# Patient Record
Sex: FEMALE | Race: WHITE | Employment: OTHER | URBAN - METROPOLITAN AREA
[De-identification: names, ages, dates, MRNs, and addresses within clinical notes are randomized per-mention and may not be internally consistent; named-entity substitution may affect disease eponyms.]

---

## 2021-02-10 ENCOUNTER — HOSPITAL ENCOUNTER (OUTPATIENT)
Dept: LAB | Age: 81
Discharge: HOME | End: 2021-02-10

## 2021-02-10 PROCEDURE — 88312 SPECIAL STAINS GROUP 1: CPT

## 2021-02-10 PROCEDURE — 88305 TISSUE EXAM BY PATHOLOGIST: CPT

## 2021-02-10 PROCEDURE — 88342 IMHCHEM/IMCYTCHM 1ST ANTB: CPT

## 2022-06-16 SDOH — ECONOMIC STABILITY: FOOD INSECURITY: WITHIN THE PAST 12 MONTHS, YOU WORRIED THAT YOUR FOOD WOULD RUN OUT BEFORE YOU GOT MONEY TO BUY MORE.: NEVER TRUE

## 2022-06-16 SDOH — ECONOMIC STABILITY: FOOD INSECURITY: WITHIN THE PAST 12 MONTHS, THE FOOD YOU BOUGHT JUST DIDN'T LAST AND YOU DIDN'T HAVE MONEY TO GET MORE.: NEVER TRUE

## 2022-06-16 ASSESSMENT — PATIENT HEALTH QUESTIONNAIRE - PHQ9
SUM OF ALL RESPONSES TO PHQ9 QUESTIONS 1 & 2: 0
1. LITTLE INTEREST OR PLEASURE IN DOING THINGS: 0
SUM OF ALL RESPONSES TO PHQ QUESTIONS 1-9: 0
2. FEELING DOWN, DEPRESSED OR HOPELESS: 0
SUM OF ALL RESPONSES TO PHQ QUESTIONS 1-9: 0

## 2022-06-16 ASSESSMENT — LIFESTYLE VARIABLES: HOW OFTEN DO YOU HAVE A DRINK CONTAINING ALCOHOL: NEVER

## 2022-06-16 ASSESSMENT — SOCIAL DETERMINANTS OF HEALTH (SDOH): HOW HARD IS IT FOR YOU TO PAY FOR THE VERY BASICS LIKE FOOD, HOUSING, MEDICAL CARE, AND HEATING?: NOT HARD AT ALL

## 2022-06-17 ENCOUNTER — OFFICE VISIT (OUTPATIENT)
Dept: INTERNAL MEDICINE CLINIC | Facility: CLINIC | Age: 82
End: 2022-06-17
Payer: MEDICARE

## 2022-06-17 VITALS
SYSTOLIC BLOOD PRESSURE: 152 MMHG | BODY MASS INDEX: 23.56 KG/M2 | RESPIRATION RATE: 16 BRPM | HEIGHT: 60 IN | OXYGEN SATURATION: 98 % | DIASTOLIC BLOOD PRESSURE: 70 MMHG | WEIGHT: 120 LBS | TEMPERATURE: 97.6 F | HEART RATE: 67 BPM

## 2022-06-17 DIAGNOSIS — E78.00 PURE HYPERCHOLESTEROLEMIA: ICD-10-CM

## 2022-06-17 DIAGNOSIS — D50.8 IRON DEFICIENCY ANEMIA SECONDARY TO INADEQUATE DIETARY IRON INTAKE: ICD-10-CM

## 2022-06-17 DIAGNOSIS — I10 ESSENTIAL HYPERTENSION, BENIGN: ICD-10-CM

## 2022-06-17 DIAGNOSIS — R21 RASH: ICD-10-CM

## 2022-06-17 DIAGNOSIS — Z00.00 MEDICARE ANNUAL WELLNESS VISIT, SUBSEQUENT: Primary | ICD-10-CM

## 2022-06-17 DIAGNOSIS — I73.9 PAD (PERIPHERAL ARTERY DISEASE) (HCC): ICD-10-CM

## 2022-06-17 PROCEDURE — 1123F ACP DISCUSS/DSCN MKR DOCD: CPT | Performed by: INTERNAL MEDICINE

## 2022-06-17 PROCEDURE — G0402 INITIAL PREVENTIVE EXAM: HCPCS | Performed by: INTERNAL MEDICINE

## 2022-06-17 RX ORDER — CLOTRIMAZOLE AND BETAMETHASONE DIPROPIONATE 10; .64 MG/G; MG/G
CREAM TOPICAL
Qty: 15 G | Refills: 1 | Status: SHIPPED | OUTPATIENT
Start: 2022-06-17

## 2022-06-17 ASSESSMENT — PATIENT HEALTH QUESTIONNAIRE - PHQ9
2. FEELING DOWN, DEPRESSED OR HOPELESS: 0
SUM OF ALL RESPONSES TO PHQ QUESTIONS 1-9: 0
1. LITTLE INTEREST OR PLEASURE IN DOING THINGS: 0
SUM OF ALL RESPONSES TO PHQ QUESTIONS 1-9: 0
SUM OF ALL RESPONSES TO PHQ9 QUESTIONS 1 & 2: 0

## 2022-06-17 NOTE — PATIENT INSTRUCTIONS
Personalized Preventive Plan for Jessica Marie - 6/17/2022  Medicare offers a range of preventive health benefits. Some of the tests and screenings are paid in full while other may be subject to a deductible, co-insurance, and/or copay. Some of these benefits include a comprehensive review of your medical history including lifestyle, illnesses that may run in your family, and various assessments and screenings as appropriate. After reviewing your medical record and screening and assessments performed today your provider may have ordered immunizations, labs, imaging, and/or referrals for you. A list of these orders (if applicable) as well as your Preventive Care list are included within your After Visit Summary for your review. Other Preventive Recommendations:    · A preventive eye exam performed by an eye specialist is recommended every 1-2 years to screen for glaucoma; cataracts, macular degeneration, and other eye disorders. · A preventive dental visit is recommended every 6 months. · Try to get at least 150 minutes of exercise per week or 10,000 steps per day on a pedometer . · Order or download the FREE \"Exercise & Physical Activity: Your Everyday Guide\" from [employer]. Call 1-111.973.6247 or search [employer] online. · You need 8976-4494 mg of calcium and 9859-6550 IU of vitamin D per day. It is possible to meet your calcium requirement with diet alone, but a vitamin D supplement is usually necessary to meet this goal.  · When exposed to the sun, use a sunscreen that protects against both UVA and UVB radiation with an SPF of 30 or greater. Reapply every 2 to 3 hours or after sweating, drying off with a towel, or swimming. · Always wear a seat belt when traveling in a car. Always wear a helmet when riding a bicycle or motorcycle.

## 2022-06-17 NOTE — PROGRESS NOTES
Medicare Annual Wellness Visit    Lidia Dubin is here for Medicare AWV (subsequent ), Follow-up Chronic Condition (6 month f/u), Skin Problem (wrist), and Flank Pain (right x 6 month after fall in december)    Assessment & Plan   Medicare annual wellness visit, subsequent      Recommendations for Preventive Services Due: see orders and patient instructions/AVS.  Recommended screening schedule for the next 5-10 years is provided to the patient in written form: see Patient Instructions/AVS.     Return for Medicare Annual Wellness Visit in 1 year. Subjective   Chronic active medical issues HTN, HLD, OA multiple joints with spinal stenosis, SARAHI with PUD  Reports better BP readings at home. Patient's complete Health Risk Assessment and screening values have been reviewed and are found in Flowsheets. The following problems were reviewed today and where indicated follow up appointments were made and/or referrals ordered.     Positive Risk Factor Screenings with Interventions:    Fall Risk:  Do you feel unsteady or are you worried about falling? : no  2 or more falls in past year?: (!) yes  Fall with injury in past year?: (!) yes     Fall Risk Interventions:    · Home safety tips provided  · Home exercises provided to promote strength and balance            General Health and ACP:  General  In general, how would you say your health is?: Good  In the past 7 days, have you experienced any of the following: New or Increased Pain, New or Increased Fatigue, Loneliness, Social Isolation, Stress or Anger?: No  Do you get the social and emotional support that you need?: Yes  Do you have a Living Will?: Yes    Advance Directives     Power of  Living Will ACP-Advance Directive ACP-Power of Suresh Gan on 08/23/17 Not on File Not on File [address] [facility] [place] Risk Interventions:  · Pain issues: home exercises provided, medication prescribed- chair exercises    Health Habits/Nutrition:     Physical Activity: Inactive    Days of Exercise per Week: 0 days    Minutes of Exercise per Session: 0 min     Have you lost any weight without trying in the past 3 months?: No  Body mass index: 23.43  Have you seen the dentist within the past year?: N/A - wear dentures    Health Habits/Nutrition Interventions:  · Inadequate physical activity:  patient agrees to wear a pedometer and walk at least 10,000 steps/day, patient agrees to increase physical activity as follows: chair exercises    Hearing/Vision:  Do you or your family notice any trouble with your hearing that hasn't been managed with hearing aids?: No  Do you have difficulty driving, watching TV, or doing any of your daily activities because of your eyesight?: No  Have you had an eye exam within the past year?: (!) No  No exam data present    Hearing/Vision Interventions:  · encouraged eye exam            Objective   Vitals:    06/17/22 0926   BP: (!) 152/70   Site: Left Upper Arm   Position: Sitting   Pulse: 67   Resp: 16   Temp: 97.6 °F (36.4 °C)   TempSrc: Temporal   SpO2: 98%   Weight: 120 lb (54.4 kg)   Height: 5' (1.524 m)      Body mass index is 23.44 kg/m².         General Appearance: alert and oriented to person, place and time and in no acute distress  Head: normocephalic and atraumatic  Pulmonary/Chest: clear to auscultation bilaterally- no wheezes, rales or rhonchi, normal air movement, no respiratory distress  Cardiovascular: normal rate, normal S1 and S2, no gallops, intact distal pulses and no carotid bruits  Abdomen: soft, non-tender, non-distended, normal bowel sounds, no masses or organomegaly  Musculoskeletal: normal range of motion, no joint swelling, deformity or tenderness, joint deformity present-  bilateral knees, osteoarthritic changes noted in both hands and crepitus present-  bilateral  Knees and shoulders  Neurologic: speech normal, gait abnormal- stooped boster due to kyphosis and spinal stenosis and muscle weakness present-

## 2022-06-20 LAB
ANION GAP SERPL CALC-SCNC: 8 MMOL/L (ref 7–16)
BASOPHILS # BLD: 0.1 K/UL (ref 0–0.2)
BASOPHILS NFR BLD: 2 % (ref 0–2)
BUN SERPL-MCNC: 21 MG/DL (ref 8–23)
CALCIUM SERPL-MCNC: 9.9 MG/DL (ref 8.3–10.4)
CHLORIDE SERPL-SCNC: 104 MMOL/L (ref 98–107)
CHOLEST SERPL-MCNC: 236 MG/DL
CO2 SERPL-SCNC: 26 MMOL/L (ref 21–32)
CREAT SERPL-MCNC: 0.7 MG/DL (ref 0.6–1)
DIFFERENTIAL METHOD BLD: ABNORMAL
EOSINOPHIL # BLD: 0.5 K/UL (ref 0–0.8)
EOSINOPHIL NFR BLD: 8 % (ref 0.5–7.8)
ERYTHROCYTE [DISTWIDTH] IN BLOOD BY AUTOMATED COUNT: 12.8 % (ref 11.9–14.6)
GLUCOSE SERPL-MCNC: 79 MG/DL (ref 65–100)
HCT VFR BLD AUTO: 37.2 % (ref 35.8–46.3)
HDLC SERPL-MCNC: 80 MG/DL (ref 40–60)
HDLC SERPL: 3 {RATIO}
HGB BLD-MCNC: 12.1 G/DL (ref 11.7–15.4)
IMM GRANULOCYTES # BLD AUTO: 0 K/UL (ref 0–0.5)
IMM GRANULOCYTES NFR BLD AUTO: 0 % (ref 0–5)
IRON SERPL-MCNC: 96 UG/DL (ref 35–150)
LDLC SERPL CALC-MCNC: 137.8 MG/DL
LYMPHOCYTES # BLD: 1.6 K/UL (ref 0.5–4.6)
LYMPHOCYTES NFR BLD: 25 % (ref 13–44)
MCH RBC QN AUTO: 31.3 PG (ref 26.1–32.9)
MCHC RBC AUTO-ENTMCNC: 32.5 G/DL (ref 31.4–35)
MCV RBC AUTO: 96.1 FL (ref 79.6–97.8)
MONOCYTES # BLD: 0.6 K/UL (ref 0.1–1.3)
MONOCYTES NFR BLD: 9 % (ref 4–12)
NEUTS SEG # BLD: 3.6 K/UL (ref 1.7–8.2)
NEUTS SEG NFR BLD: 56 % (ref 43–78)
NRBC # BLD: 0 K/UL (ref 0–0.2)
PLATELET # BLD AUTO: 366 K/UL (ref 150–450)
PMV BLD AUTO: 10.1 FL (ref 9.4–12.3)
POTASSIUM SERPL-SCNC: 4.8 MMOL/L (ref 3.5–5.1)
RBC # BLD AUTO: 3.87 M/UL (ref 4.05–5.2)
SODIUM SERPL-SCNC: 138 MMOL/L (ref 136–145)
TRIGL SERPL-MCNC: 91 MG/DL (ref 35–150)
VLDLC SERPL CALC-MCNC: 18.2 MG/DL (ref 6–23)
WBC # BLD AUTO: 6.5 K/UL (ref 4.3–11.1)

## 2022-06-21 LAB — FERRITIN SERPL-MCNC: 57 NG/ML (ref 8–388)

## 2022-06-23 ENCOUNTER — TELEPHONE (OUTPATIENT)
Dept: INTERNAL MEDICINE CLINIC | Facility: CLINIC | Age: 82
End: 2022-06-23

## 2022-06-23 NOTE — TELEPHONE ENCOUNTER
----- Message from Duncan Vu MD sent at 6/23/2022  7:58 AM EDT -----  Please call and notify patient:   Anemia has resolved and iron levels are normal.   Cholesterol is higher. Recommend low fat high fiber diet.    Kidney fxn in normal.   Duncan Vu MD

## 2022-08-22 RX ORDER — GEMFIBROZIL 600 MG/1
TABLET, FILM COATED ORAL
Qty: 30 TABLET | Refills: 5 | Status: SHIPPED | OUTPATIENT
Start: 2022-08-22

## 2022-10-05 RX ORDER — DILTIAZEM HYDROCHLORIDE 300 MG/1
CAPSULE, COATED, EXTENDED RELEASE ORAL
Qty: 90 CAPSULE | Refills: 3 | Status: SHIPPED | OUTPATIENT
Start: 2022-10-05

## 2022-12-20 ENCOUNTER — OFFICE VISIT (OUTPATIENT)
Dept: INTERNAL MEDICINE CLINIC | Facility: CLINIC | Age: 82
End: 2022-12-20
Payer: MEDICARE

## 2022-12-20 VITALS
WEIGHT: 122 LBS | DIASTOLIC BLOOD PRESSURE: 78 MMHG | SYSTOLIC BLOOD PRESSURE: 152 MMHG | TEMPERATURE: 97.3 F | HEART RATE: 70 BPM | OXYGEN SATURATION: 99 % | HEIGHT: 60 IN | BODY MASS INDEX: 23.95 KG/M2 | RESPIRATION RATE: 18 BRPM

## 2022-12-20 DIAGNOSIS — R21 RASH: ICD-10-CM

## 2022-12-20 DIAGNOSIS — I10 ESSENTIAL HYPERTENSION, BENIGN: Primary | ICD-10-CM

## 2022-12-20 DIAGNOSIS — Z91.81 AT HIGH RISK FOR FALLS: ICD-10-CM

## 2022-12-20 DIAGNOSIS — I73.9 PAD (PERIPHERAL ARTERY DISEASE) (HCC): ICD-10-CM

## 2022-12-20 DIAGNOSIS — E78.2 MIXED HYPERLIPIDEMIA: ICD-10-CM

## 2022-12-20 PROCEDURE — 1123F ACP DISCUSS/DSCN MKR DOCD: CPT | Performed by: INTERNAL MEDICINE

## 2022-12-20 PROCEDURE — 3074F SYST BP LT 130 MM HG: CPT | Performed by: INTERNAL MEDICINE

## 2022-12-20 PROCEDURE — 99213 OFFICE O/P EST LOW 20 MIN: CPT | Performed by: INTERNAL MEDICINE

## 2022-12-20 PROCEDURE — 3078F DIAST BP <80 MM HG: CPT | Performed by: INTERNAL MEDICINE

## 2022-12-20 RX ORDER — DILTIAZEM HYDROCHLORIDE 300 MG/1
CAPSULE, EXTENDED RELEASE ORAL
Qty: 90 CAPSULE | Refills: 3 | Status: SHIPPED | OUTPATIENT
Start: 2022-12-20

## 2022-12-20 RX ORDER — OMEPRAZOLE 20 MG/1
20 CAPSULE, DELAYED RELEASE ORAL DAILY
Qty: 90 CAPSULE | Refills: 0 | Status: SHIPPED | OUTPATIENT
Start: 2022-12-20

## 2022-12-20 RX ORDER — GEMFIBROZIL 600 MG/1
TABLET, FILM COATED ORAL
Qty: 30 TABLET | Refills: 5 | Status: SHIPPED | OUTPATIENT
Start: 2022-12-20

## 2022-12-20 ASSESSMENT — PATIENT HEALTH QUESTIONNAIRE - PHQ9
SUM OF ALL RESPONSES TO PHQ QUESTIONS 1-9: 0
SUM OF ALL RESPONSES TO PHQ9 QUESTIONS 1 & 2: 0
SUM OF ALL RESPONSES TO PHQ QUESTIONS 1-9: 0
SUM OF ALL RESPONSES TO PHQ QUESTIONS 1-9: 0
2. FEELING DOWN, DEPRESSED OR HOPELESS: 0
SUM OF ALL RESPONSES TO PHQ QUESTIONS 1-9: 0
1. LITTLE INTEREST OR PLEASURE IN DOING THINGS: 0

## 2022-12-20 NOTE — PROGRESS NOTES
12/20/2022   Location:[facility] [place] [facility]  [place]  Patient #:  [id number]  YOB: 1940        History of Present Illness     Chief Complaint   Patient presents with    Follow-up Chronic Condition     6 month f/u    Fall     In July knee pain left    Knee Pain     left    Hypertension    Arthritis       Ms. Humera Little is a 78 y.o. female  who presents for Follow up on chronic medical issues. There is compliance and tolerance with medications. Chronic active medical issues HTN, HLD, OA multiple joints with spinal stenosis, SARAHI with PUD  Reports better BP readings at home. Had a fall in July and hit her left knee. It got better now when seh gets up and down it bothers her. Hs has issues with sciatica on the right. Now has increase stinging in her right hip. Mostly at night when she lays on the leftside it pulls right hip. But she can lay on the right side okay.     Rash on wrists persists looks  Last Labs  CBC:   Lab Results   Component Value Date/Time    WBC 6.5 06/17/2022 10:34 AM    RBC 3.87 06/17/2022 10:34 AM    HGB 12.1 06/17/2022 10:34 AM    HCT 37.2 06/17/2022 10:34 AM    MCV 96.1 06/17/2022 10:34 AM    MCH 31.3 06/17/2022 10:34 AM    MCHC 32.5 06/17/2022 10:34 AM    RDW 12.8 06/17/2022 10:34 AM     06/17/2022 10:34 AM    MPV 10.1 06/17/2022 10:34 AM     BMP:    Lab Results   Component Value Date/Time     06/17/2022 10:34 AM    K 4.8 06/17/2022 10:34 AM     06/17/2022 10:34 AM    CO2 26 06/17/2022 10:34 AM    BUN 21 06/17/2022 10:34 AM    LABALBU 4.4 12/17/2021 09:09 AM    CREATININE 0.70 06/17/2022 10:34 AM    CALCIUM 9.9 06/17/2022 10:34 AM    GFRAA >60 06/17/2022 10:34 AM    LABGLOM >60 06/17/2022 10:34 AM    GLUCOSE 79 06/17/2022 10:34 AM     FLP:    Lab Results   Component Value Date/Time    TRIG 91 06/17/2022 10:34 AM    HDL 80 06/17/2022 10:34 AM    LDLCALC 137.8 06/17/2022 10:34 AM    LABVLDL 18.2 06/17/2022 10:34 AM     TSH: Lab Results   Component Value Date/Time    TSH 3.390 2021 09:09 AM       Allergies   Allergen Reactions    Meloxicam Other (See Comments)     Constipation     Past Medical History:   Diagnosis Date    Arthropathy, unspecified, site unspecified 3/3/2015    Chest pain, unspecified     Contusion of unspecified site     Cramp of limb     Edema 3/3/2015    Essential hypertension, benign 3/3/2015    Loss of height     Lumbago 3/3/2015    Neoplasm of unspecified nature of breast     Other and unspecified hyperlipidemia 3/3/2015    Premature menopause     Sciatica     Spinal stenosis, lumbar region, without neurogenic claudication     Thoracic or lumbosacral neuritis or radiculitis, unspecified 3/3/2015    Unspecified menopausal and postmenopausal disorder     Unspecified prolapse of vaginal walls     Unspecified vitamin D deficiency      Social History     Socioeconomic History    Marital status:      Spouse name: None    Number of children: None    Years of education: None    Highest education level: None   Tobacco Use    Smoking status: Never    Smokeless tobacco: Never   Substance and Sexual Activity    Alcohol use: No    Drug use: No     Social Determinants of Health     Financial Resource Strain: Low Risk     Difficulty of Paying Living Expenses: Not hard at all   Food Insecurity: No Food Insecurity    Worried About Running Out of Food in the Last Year: Never true    Ran Out of Food in the Last Year: Never true   Physical Activity: Inactive    Days of Exercise per Week: 0 days    Minutes of Exercise per Session: 0 min     Past Surgical History:   Procedure Laterality Date     SECTION      x3    COLONOSCOPY      HYSTERECTOMY (CERVIX STATUS UNKNOWN)  1984    OTHER SURGICAL HISTORY      Laminectomy    UPPER GASTROINTESTINAL ENDOSCOPY       Family History   Problem Relation Age of Onset    Diabetes Brother     Headache Brother     Coronary Art Dis Father     Cancer Mother      Current Outpatient Medications   Medication Sig Dispense Refill    dilTIAZem (CARDIZEM CD) 300 MG extended release capsule Take 1 capsule by mouth once daily 90 capsule 3    gemfibrozil (LOPID) 600 MG tablet Take 1 tablet by mouth once daily 30 tablet 5    clotrimazole-betamethasone (LOTRISONE) 1-0.05 % cream Apply topically 2 times daily to wrist area 15 g 1    acetaminophen (TYLENOL) 500 MG tablet Take by mouth every 6 hours as needed      calcium carbonate 1500 (600 Ca) MG TABS tablet Take 600 mg by mouth 2 times daily      enalapril (VASOTEC) 5 MG tablet TAKE 1 TABLET BY MOUTH IN THE MORNING AND 2 TABLETS IN THE EVENING AS DIRECTED FOR HYPERTENSION       No current facility-administered medications for this visit. Health Maintenance   Topic Date Due    COVID-19 Vaccine (1) Never done    DTaP/Tdap/Td vaccine (1 - Tdap) Never done    Shingles vaccine (1 of 2) Never done    DEXA (modify frequency per FRAX score)  Never done    Breast cancer screen  11/21/2021    Flu vaccine (1) Never done    Depression Screen  06/17/2023    Annual Wellness Visit (AWV)  06/18/2023    Pneumococcal 65+ years Vaccine  Completed    Hepatitis A vaccine  Aged Out    Hib vaccine  Aged Out    Meningococcal (ACWY) vaccine  Aged Out             Review of Systems  Review of Systems   Constitutional:  Negative for fever. Respiratory:  Negative for cough and shortness of breath. Cardiovascular:  Negative for chest pain. Gastrointestinal:  Negative for abdominal pain. Genitourinary:  Negative for difficulty urinating. Musculoskeletal:  Positive for arthralgias, back pain, gait problem and myalgias.      BP (!) 165/71 (Site: Left Upper Arm, Position: Sitting)   Pulse 70   Temp 97.3 °F (36.3 °C) (Temporal)   Resp 18   Ht 5' (1.524 m)   Wt 122 lb (55.3 kg)   SpO2 99%   BMI 23.83 kg/m²     Wt Readings from Last 3 Encounters:   12/20/22 122 lb (55.3 kg)   06/17/22 120 lb (54.4 kg)   12/17/21 121 lb 12.8 oz (55.2 kg)       Physical Exam    Physical Exam  Vitals and nursing note reviewed. Constitutional:       Appearance: Normal appearance. HENT:      Head: Normocephalic and atraumatic. Cardiovascular:      Rate and Rhythm: Normal rate and regular rhythm. Pulmonary:      Effort: Pulmonary effort is normal.      Breath sounds: Normal breath sounds. Musculoskeletal:      Right knee: Deformity and crepitus present. Tenderness present. Left knee: Deformity and crepitus present. Tenderness present. Skin:     Findings: Rash present. Rash is papular (plaque like). Neurological:      Mental Status: She is alert. Assessment & Plan    Encounter Diagnoses   Name Primary? Essential hypertension, benign Yes    Mixed hyperlipidemia     PAD (peripheral artery disease) (HCC)     Rash     At high risk for falls        Orders Placed This Encounter   Medications    gemfibrozil (LOPID) 600 MG tablet     Sig: Take 1 tablet by mouth once daily     Dispense:  30 tablet     Refill:  5    dilTIAZem (TIAZAC) 300 MG extended release capsule     Sig: Take 1 capsule by mouth once daily     Dispense:  90 capsule     Refill:  3    omeprazole (PRILOSEC) 20 MG delayed release capsule     Sig: Take 1 capsule by mouth daily     Dispense:  90 capsule     Refill:  0       Orders Placed This Encounter   Procedures    External Referral to Dermatology     Referral Priority:   Routine     Referral Type:   Eval and Treat     Referral Reason:   Specialty Services Required     Requested Specialty:   Dermatology     Number of Visits Requested:   1       Recommend salon patches along with Tylenol     Return in about 6 months (around 6/20/2023), or if symptoms worsen or fail to improve, for routine follow up of chronic medical issues.         Patsy Cm MD

## 2023-01-03 ASSESSMENT — ENCOUNTER SYMPTOMS
BACK PAIN: 1
SHORTNESS OF BREATH: 0
ABDOMINAL PAIN: 0
COUGH: 0

## 2023-02-23 ENCOUNTER — OFFICE VISIT (OUTPATIENT)
Dept: INTERNAL MEDICINE CLINIC | Facility: CLINIC | Age: 83
End: 2023-02-23
Payer: MEDICARE

## 2023-02-23 VITALS
RESPIRATION RATE: 16 BRPM | TEMPERATURE: 98.2 F | BODY MASS INDEX: 25.91 KG/M2 | SYSTOLIC BLOOD PRESSURE: 152 MMHG | HEART RATE: 65 BPM | OXYGEN SATURATION: 98 % | DIASTOLIC BLOOD PRESSURE: 68 MMHG | WEIGHT: 132 LBS | HEIGHT: 60 IN

## 2023-02-23 DIAGNOSIS — I10 ESSENTIAL HYPERTENSION, BENIGN: Primary | ICD-10-CM

## 2023-02-23 DIAGNOSIS — M15.9 GENERALIZED OSTEOARTHRITIS OF HAND: ICD-10-CM

## 2023-02-23 PROCEDURE — 3074F SYST BP LT 130 MM HG: CPT | Performed by: INTERNAL MEDICINE

## 2023-02-23 PROCEDURE — 1123F ACP DISCUSS/DSCN MKR DOCD: CPT | Performed by: INTERNAL MEDICINE

## 2023-02-23 PROCEDURE — 3078F DIAST BP <80 MM HG: CPT | Performed by: INTERNAL MEDICINE

## 2023-02-23 PROCEDURE — 99214 OFFICE O/P EST MOD 30 MIN: CPT | Performed by: INTERNAL MEDICINE

## 2023-02-23 RX ORDER — OMEPRAZOLE 20 MG/1
20 CAPSULE, DELAYED RELEASE ORAL DAILY
Qty: 90 CAPSULE | Refills: 3 | Status: SHIPPED | OUTPATIENT
Start: 2023-02-23

## 2023-02-23 RX ORDER — ENALAPRIL MALEATE 5 MG/1
10 TABLET ORAL 2 TIMES DAILY
Qty: 360 TABLET | Refills: 3 | Status: SHIPPED | OUTPATIENT
Start: 2023-02-23 | End: 2024-02-23

## 2023-02-23 SDOH — ECONOMIC STABILITY: FOOD INSECURITY: WITHIN THE PAST 12 MONTHS, YOU WORRIED THAT YOUR FOOD WOULD RUN OUT BEFORE YOU GOT MONEY TO BUY MORE.: NEVER TRUE

## 2023-02-23 SDOH — ECONOMIC STABILITY: INCOME INSECURITY: HOW HARD IS IT FOR YOU TO PAY FOR THE VERY BASICS LIKE FOOD, HOUSING, MEDICAL CARE, AND HEATING?: NOT HARD AT ALL

## 2023-02-23 SDOH — ECONOMIC STABILITY: FOOD INSECURITY: WITHIN THE PAST 12 MONTHS, THE FOOD YOU BOUGHT JUST DIDN'T LAST AND YOU DIDN'T HAVE MONEY TO GET MORE.: NEVER TRUE

## 2023-02-23 SDOH — ECONOMIC STABILITY: HOUSING INSECURITY
IN THE LAST 12 MONTHS, WAS THERE A TIME WHEN YOU DID NOT HAVE A STEADY PLACE TO SLEEP OR SLEPT IN A SHELTER (INCLUDING NOW)?: NO

## 2023-02-23 ASSESSMENT — PATIENT HEALTH QUESTIONNAIRE - PHQ9
SUM OF ALL RESPONSES TO PHQ QUESTIONS 1-9: 0
SUM OF ALL RESPONSES TO PHQ9 QUESTIONS 1 & 2: 0
1. LITTLE INTEREST OR PLEASURE IN DOING THINGS: 0
2. FEELING DOWN, DEPRESSED OR HOPELESS: 0
SUM OF ALL RESPONSES TO PHQ QUESTIONS 1-9: 0

## 2023-02-23 NOTE — PROGRESS NOTES
02/23/2023   Location:[facility] [address] [facility]  [place]  Patient #:  [id number]  YOB: 1940        History of Present Illness     Chief Complaint   Patient presents with    Follow-up     Rachael 02/16/23    Urinary Frequency     X several month chronic       MsMalik Loredo is a 78 y.o. female  who presents for ER follow up    On 2/16/23 seen in the er for pain and swelling of her left hand. It has since resolved. Xray showed severe DJD. Her BP was also noted to be very high. 214/93    Last Labs  CBC:   Lab Results   Component Value Date/Time    WBC 6.5 06/17/2022 10:34 AM    RBC 3.87 06/17/2022 10:34 AM    HGB 12.1 06/17/2022 10:34 AM    HCT 37.2 06/17/2022 10:34 AM    MCV 96.1 06/17/2022 10:34 AM    MCH 31.3 06/17/2022 10:34 AM    MCHC 32.5 06/17/2022 10:34 AM    RDW 12.8 06/17/2022 10:34 AM     06/17/2022 10:34 AM    MPV 10.1 06/17/2022 10:34 AM     CMP:    Lab Results   Component Value Date/Time     06/17/2022 10:34 AM    K 4.8 06/17/2022 10:34 AM     06/17/2022 10:34 AM    CO2 26 06/17/2022 10:34 AM    BUN 21 06/17/2022 10:34 AM    CREATININE 0.70 06/17/2022 10:34 AM    GFRAA >60 06/17/2022 10:34 AM    AGRATIO 2.2 12/17/2021 09:09 AM    LABGLOM >60 06/17/2022 10:34 AM    GLUCOSE 79 06/17/2022 10:34 AM    PROT 6.4 12/17/2021 09:09 AM    LABALBU 4.4 12/17/2021 09:09 AM    CALCIUM 9.9 06/17/2022 10:34 AM    BILITOT 0.3 12/17/2021 09:09 AM    ALKPHOS 133 12/17/2021 09:09 AM    AST 21 12/17/2021 09:09 AM    ALT 15 12/17/2021 09:09 AM     FLP:    Lab Results   Component Value Date/Time    TRIG 91 06/17/2022 10:34 AM    HDL 80 06/17/2022 10:34 AM    LDLCALC 137.8 06/17/2022 10:34 AM    LABVLDL 18.2 06/17/2022 10:34 AM     TSH:    Lab Results   Component Value Date/Time    TSH 3.390 12/17/2021 09:09 AM       Allergies   Allergen Reactions    Meloxicam Other (See Comments)     Constipation     Past Medical History:   Diagnosis Date    Arthropathy, unspecified, site unspecified 3/3/2015    Chest pain, unspecified     Contusion of unspecified site     Cramp of limb     Edema 3/3/2015    Essential hypertension, benign 3/3/2015    Loss of height     Lumbago 3/3/2015    Neoplasm of unspecified nature of breast     Other and unspecified hyperlipidemia 3/3/2015    Premature menopause     Sciatica     Spinal stenosis, lumbar region, without neurogenic claudication     Thoracic or lumbosacral neuritis or radiculitis, unspecified 3/3/2015    Unspecified menopausal and postmenopausal disorder     Unspecified prolapse of vaginal walls     Unspecified vitamin D deficiency      Social History     Socioeconomic History    Marital status:      Spouse name: None    Number of children: None    Years of education: None    Highest education level: None   Tobacco Use    Smoking status: Never    Smokeless tobacco: Never   Substance and Sexual Activity    Alcohol use: No    Drug use: No     Social Determinants of Health     Financial Resource Strain: Low Risk     Difficulty of Paying Living Expenses: Not hard at all   Food Insecurity: No Food Insecurity    Worried About Running Out of Food in the Last Year: Never true    Ran Out of Food in the Last Year: Never true   Transportation Needs: Unknown    Lack of Transportation (Non-Medical):  No   Physical Activity: Inactive    Days of Exercise per Week: 0 days    Minutes of Exercise per Session: 0 min   Housing Stability: Unknown    Unstable Housing in the Last Year: No     Past Surgical History:   Procedure Laterality Date     SECTION      x3    COLONOSCOPY      HYSTERECTOMY (CERVIX STATUS UNKNOWN)  1984    OTHER SURGICAL HISTORY      Laminectomy    UPPER GASTROINTESTINAL ENDOSCOPY       Family History   Problem Relation Age of Onset    Diabetes Brother     Headache Brother     Coronary Art Dis Father     Cancer Mother      Current Outpatient Medications   Medication Sig Dispense Refill    gemfibrozil (LOPID) 600 MG tablet Take 1 tablet by mouth once daily 30 tablet 5    dilTIAZem (TIAZAC) 300 MG extended release capsule Take 1 capsule by mouth once daily 90 capsule 3    omeprazole (PRILOSEC) 20 MG delayed release capsule Take 1 capsule by mouth daily 90 capsule 0    acetaminophen (TYLENOL) 500 MG tablet Take by mouth every 6 hours as needed      calcium carbonate 1500 (600 Ca) MG TABS tablet Take 600 mg by mouth 2 times daily      enalapril (VASOTEC) 5 MG tablet TAKE 1 TABLET BY MOUTH IN THE MORNING AND 2 TABLETS IN THE EVENING AS DIRECTED FOR HYPERTENSION      dilTIAZem (CARDIZEM CD) 300 MG extended release capsule Take 1 capsule by mouth once daily (Patient not taking: Reported on 2/23/2023) 90 capsule 3    clotrimazole-betamethasone (LOTRISONE) 1-0.05 % cream Apply topically 2 times daily to wrist area (Patient not taking: Reported on 2/23/2023) 15 g 1     No current facility-administered medications for this visit. Health Maintenance   Topic Date Due    COVID-19 Vaccine (1) Never done    DTaP/Tdap/Td vaccine (1 - Tdap) Never done    Shingles vaccine (1 of 2) Never done    DEXA (modify frequency per FRAX score)  Never done    Breast cancer screen  11/21/2021    Flu vaccine (1) Never done    Annual Wellness Visit (AWV)  06/18/2023    Depression Screen  12/20/2023    Pneumococcal 65+ years Vaccine  Completed    Hepatitis A vaccine  Aged Out    Hib vaccine  Aged Out    Meningococcal (ACWY) vaccine  Aged Out             Review of Systems  Review of Systems    BP (!) 152/68 (Site: Left Upper Arm, Position: Sitting)   Pulse 65   Temp 98.2 °F (36.8 °C) (Temporal)   Resp 16   Ht 5' (1.524 m)   Wt 132 lb (59.9 kg)   SpO2 98%   BMI 25.78 kg/m²     Wt Readings from Last 3 Encounters:   02/23/23 132 lb (59.9 kg)   12/20/22 122 lb (55.3 kg)   06/17/22 120 lb (54.4 kg)       Physical Exam    Physical Exam  Vitals and nursing note reviewed. Constitutional:       Appearance: Normal appearance.    HENT:      Head: Normocephalic and atraumatic. Cardiovascular:      Rate and Rhythm: Normal rate and regular rhythm. Pulmonary:      Effort: Pulmonary effort is normal.      Breath sounds: Normal breath sounds. Musculoskeletal:      Right hand: Deformity and tenderness present. No swelling. Left hand: Swelling, deformity and tenderness present. Neurological:      Mental Status: She is alert. Assessment & Plan    Encounter Diagnoses   Name Primary? Essential hypertension, benign Yes    Generalized osteoarthritis of hand        Orders Placed This Encounter   Medications    omeprazole (PRILOSEC) 20 MG delayed release capsule     Sig: Take 1 capsule by mouth daily     Dispense:  90 capsule     Refill:  3    enalapril (VASOTEC) 5 MG tablet     Sig: Take 2 tablets by mouth 2 times daily     Dispense:  360 tablet     Refill:  3       No orders of the defined types were placed in this encounter. Increase Vasotec for better BP control. Unable to provide urine sample. Bladder issues likely related to age with post menopausal changes. No follow-ups on file.         Ariadne Catalan MD

## 2023-03-02 PROBLEM — I73.9 PAD (PERIPHERAL ARTERY DISEASE) (HCC): Status: ACTIVE | Noted: 2023-03-02

## 2023-03-21 RX ORDER — ENALAPRIL MALEATE 5 MG/1
TABLET ORAL
Qty: 270 TABLET | Refills: 0 | OUTPATIENT
Start: 2023-03-21

## 2023-03-23 ENCOUNTER — OFFICE VISIT (OUTPATIENT)
Dept: INTERNAL MEDICINE CLINIC | Facility: CLINIC | Age: 83
End: 2023-03-23
Payer: MEDICARE

## 2023-03-23 VITALS
OXYGEN SATURATION: 97 % | BODY MASS INDEX: 26.03 KG/M2 | SYSTOLIC BLOOD PRESSURE: 154 MMHG | HEIGHT: 60 IN | WEIGHT: 132.6 LBS | RESPIRATION RATE: 17 BRPM | HEART RATE: 70 BPM | TEMPERATURE: 98.1 F | DIASTOLIC BLOOD PRESSURE: 68 MMHG

## 2023-03-23 DIAGNOSIS — I10 ESSENTIAL (PRIMARY) HYPERTENSION: Primary | ICD-10-CM

## 2023-03-23 PROCEDURE — 99213 OFFICE O/P EST LOW 20 MIN: CPT | Performed by: NURSE PRACTITIONER

## 2023-03-23 PROCEDURE — 3077F SYST BP >= 140 MM HG: CPT | Performed by: NURSE PRACTITIONER

## 2023-03-23 PROCEDURE — 3078F DIAST BP <80 MM HG: CPT | Performed by: NURSE PRACTITIONER

## 2023-03-23 PROCEDURE — 1123F ACP DISCUSS/DSCN MKR DOCD: CPT | Performed by: NURSE PRACTITIONER

## 2023-03-23 ASSESSMENT — ENCOUNTER SYMPTOMS
VOMITING: 0
SHORTNESS OF BREATH: 0
NAUSEA: 0

## 2023-03-23 NOTE — PROGRESS NOTES
or diaphoretic. HENT:      Mouth/Throat:      Mouth: Mucous membranes are moist.   Cardiovascular:      Rate and Rhythm: Regular rhythm. Pulmonary:      Effort: No respiratory distress. Breath sounds: No wheezing, rhonchi or rales. Musculoskeletal:      Right lower leg: No edema. Left lower leg: No edema. Neurological:      Mental Status: She is oriented to person, place, and time. Gait: Gait abnormal (stooped posture,antalgic gait, walking with cane). Assessment & Plan         Current Outpatient Medications   Medication Sig Dispense Refill    omeprazole (PRILOSEC) 20 MG delayed release capsule Take 1 capsule by mouth daily 90 capsule 3    enalapril (VASOTEC) 5 MG tablet Take 2 tablets by mouth 2 times daily 360 tablet 3    gemfibrozil (LOPID) 600 MG tablet Take 1 tablet by mouth once daily 30 tablet 5    dilTIAZem (CARDIZEM CD) 300 MG extended release capsule Take 1 capsule by mouth once daily 90 capsule 3    acetaminophen (TYLENOL) 500 MG tablet Take by mouth every 6 hours as needed      calcium carbonate 1500 (600 Ca) MG TABS tablet Take 600 mg by mouth 2 times daily       No current facility-administered medications for this visit. No orders of the defined types were placed in this encounter. Medications Discontinued During This Encounter   Medication Reason    dilTIAZem (TIAZAC) 300 MG extended release capsule DUPLICATE       1. Essential (primary) hypertension  Although her blood pressures not at goal in the office, she brings in a list of home readings with the majority being below 140/80. Discussed this is her target and to continue to self monitor, has had a history of hypotension associated with increasing enalapril so we will be cautious and avoid mild permissive hypertension to avoid hypotension. Continue current regimen of enalapril 10 mg twice daily.   She would like to keep the 5 mg tablets as this works well for her and that way she can decrease or increase if

## 2023-04-17 RX ORDER — DILTIAZEM HYDROCHLORIDE 300 MG/1
300 CAPSULE, COATED, EXTENDED RELEASE ORAL DAILY
Qty: 90 CAPSULE | Refills: 3 | Status: SHIPPED | OUTPATIENT
Start: 2023-04-17

## 2023-06-19 ASSESSMENT — PATIENT HEALTH QUESTIONNAIRE - PHQ9
SUM OF ALL RESPONSES TO PHQ QUESTIONS 1-9: 0
SUM OF ALL RESPONSES TO PHQ QUESTIONS 1-9: 0
SUM OF ALL RESPONSES TO PHQ9 QUESTIONS 1 & 2: 0
2. FEELING DOWN, DEPRESSED OR HOPELESS: 0
SUM OF ALL RESPONSES TO PHQ QUESTIONS 1-9: 0
SUM OF ALL RESPONSES TO PHQ QUESTIONS 1-9: 0
1. LITTLE INTEREST OR PLEASURE IN DOING THINGS: 0

## 2023-06-19 ASSESSMENT — LIFESTYLE VARIABLES
HOW OFTEN DO YOU HAVE A DRINK CONTAINING ALCOHOL: NEVER
HOW MANY STANDARD DRINKS CONTAINING ALCOHOL DO YOU HAVE ON A TYPICAL DAY: PATIENT DOES NOT DRINK

## 2023-06-20 ENCOUNTER — OFFICE VISIT (OUTPATIENT)
Dept: INTERNAL MEDICINE CLINIC | Facility: CLINIC | Age: 83
End: 2023-06-20
Payer: MEDICARE

## 2023-06-20 VITALS
TEMPERATURE: 98.3 F | SYSTOLIC BLOOD PRESSURE: 154 MMHG | WEIGHT: 129 LBS | DIASTOLIC BLOOD PRESSURE: 68 MMHG | BODY MASS INDEX: 25.32 KG/M2 | HEIGHT: 60 IN | RESPIRATION RATE: 16 BRPM | HEART RATE: 75 BPM | OXYGEN SATURATION: 98 %

## 2023-06-20 DIAGNOSIS — E78.2 MIXED HYPERLIPIDEMIA: ICD-10-CM

## 2023-06-20 DIAGNOSIS — M48.061 SPINAL STENOSIS, LUMBAR REGION WITHOUT NEUROGENIC CLAUDICATION: ICD-10-CM

## 2023-06-20 DIAGNOSIS — G62.9 NEUROPATHY: ICD-10-CM

## 2023-06-20 DIAGNOSIS — I10 ESSENTIAL (PRIMARY) HYPERTENSION: ICD-10-CM

## 2023-06-20 DIAGNOSIS — D50.8 IRON DEFICIENCY ANEMIA SECONDARY TO INADEQUATE DIETARY IRON INTAKE: ICD-10-CM

## 2023-06-20 DIAGNOSIS — I73.9 PAD (PERIPHERAL ARTERY DISEASE) (HCC): ICD-10-CM

## 2023-06-20 DIAGNOSIS — M81.0 AGE-RELATED OSTEOPOROSIS WITHOUT CURRENT PATHOLOGICAL FRACTURE: ICD-10-CM

## 2023-06-20 DIAGNOSIS — R68.89 OTHER GENERAL SYMPTOMS AND SIGNS: ICD-10-CM

## 2023-06-20 DIAGNOSIS — Z00.00 MEDICARE ANNUAL WELLNESS VISIT, SUBSEQUENT: Primary | ICD-10-CM

## 2023-06-20 LAB
BASOPHILS # BLD: 0.1 K/UL (ref 0–0.2)
BASOPHILS NFR BLD: 2 % (ref 0–2)
DIFFERENTIAL METHOD BLD: ABNORMAL
EOSINOPHIL # BLD: 0.4 K/UL (ref 0–0.8)
EOSINOPHIL NFR BLD: 6 % (ref 0.5–7.8)
ERYTHROCYTE [DISTWIDTH] IN BLOOD BY AUTOMATED COUNT: 12.3 % (ref 11.9–14.6)
HCT VFR BLD AUTO: 37.6 % (ref 35.8–46.3)
HGB BLD-MCNC: 12.1 G/DL (ref 11.7–15.4)
IMM GRANULOCYTES # BLD AUTO: 0 K/UL (ref 0–0.5)
IMM GRANULOCYTES NFR BLD AUTO: 0 % (ref 0–5)
LYMPHOCYTES # BLD: 1.4 K/UL (ref 0.5–4.6)
LYMPHOCYTES NFR BLD: 18 % (ref 13–44)
MCH RBC QN AUTO: 31.3 PG (ref 26.1–32.9)
MCHC RBC AUTO-ENTMCNC: 32.2 G/DL (ref 31.4–35)
MCV RBC AUTO: 97.4 FL (ref 82–102)
MONOCYTES # BLD: 0.6 K/UL (ref 0.1–1.3)
MONOCYTES NFR BLD: 8 % (ref 4–12)
NEUTS SEG # BLD: 5.2 K/UL (ref 1.7–8.2)
NEUTS SEG NFR BLD: 66 % (ref 43–78)
NRBC # BLD: 0 K/UL (ref 0–0.2)
PLATELET # BLD AUTO: 350 K/UL (ref 150–450)
PMV BLD AUTO: 9.8 FL (ref 9.4–12.3)
RBC # BLD AUTO: 3.86 M/UL (ref 4.05–5.2)
WBC # BLD AUTO: 7.9 K/UL (ref 4.3–11.1)

## 2023-06-20 PROCEDURE — G0439 PPPS, SUBSEQ VISIT: HCPCS | Performed by: INTERNAL MEDICINE

## 2023-06-20 PROCEDURE — 1123F ACP DISCUSS/DSCN MKR DOCD: CPT | Performed by: INTERNAL MEDICINE

## 2023-06-20 PROCEDURE — 3078F DIAST BP <80 MM HG: CPT | Performed by: INTERNAL MEDICINE

## 2023-06-20 PROCEDURE — 3074F SYST BP LT 130 MM HG: CPT | Performed by: INTERNAL MEDICINE

## 2023-06-20 PROCEDURE — 99213 OFFICE O/P EST LOW 20 MIN: CPT | Performed by: INTERNAL MEDICINE

## 2023-06-21 LAB
25(OH)D3 SERPL-MCNC: 54.9 NG/ML (ref 30–100)
ALBUMIN SERPL-MCNC: 3.6 G/DL (ref 3.2–4.6)
ALBUMIN/GLOB SERPL: 1.3 (ref 0.4–1.6)
ALP SERPL-CCNC: 98 U/L (ref 50–136)
ALT SERPL-CCNC: 26 U/L (ref 12–65)
ANION GAP SERPL CALC-SCNC: 7 MMOL/L (ref 2–11)
AST SERPL-CCNC: 34 U/L (ref 15–37)
BILIRUB SERPL-MCNC: 0.4 MG/DL (ref 0.2–1.1)
BUN SERPL-MCNC: 22 MG/DL (ref 8–23)
CALCIUM SERPL-MCNC: 9.7 MG/DL (ref 8.3–10.4)
CHLORIDE SERPL-SCNC: 106 MMOL/L (ref 101–110)
CHOLEST SERPL-MCNC: 225 MG/DL
CO2 SERPL-SCNC: 23 MMOL/L (ref 21–32)
CREAT SERPL-MCNC: 0.8 MG/DL (ref 0.6–1)
FERRITIN SERPL-MCNC: 50 NG/ML (ref 8–388)
GLOBULIN SER CALC-MCNC: 2.8 G/DL (ref 2.8–4.5)
GLUCOSE SERPL-MCNC: 90 MG/DL (ref 65–100)
HDLC SERPL-MCNC: 73 MG/DL (ref 40–60)
HDLC SERPL: 3.1
IRON SERPL-MCNC: 117 UG/DL (ref 35–150)
LDLC SERPL CALC-MCNC: 122.8 MG/DL
POTASSIUM SERPL-SCNC: 4.2 MMOL/L (ref 3.5–5.1)
PROT SERPL-MCNC: 6.4 G/DL (ref 6.3–8.2)
SODIUM SERPL-SCNC: 136 MMOL/L (ref 133–143)
TRIGL SERPL-MCNC: 146 MG/DL (ref 35–150)
TSH W FREE THYROID IF ABNORMAL: 2.84 UIU/ML (ref 0.36–3.74)
VIT B12 SERPL-MCNC: 430 PG/ML (ref 193–986)
VLDLC SERPL CALC-MCNC: 29.2 MG/DL (ref 6–23)

## 2023-06-23 ENCOUNTER — TELEPHONE (OUTPATIENT)
Dept: INTERNAL MEDICINE CLINIC | Facility: CLINIC | Age: 83
End: 2023-06-23

## 2023-06-23 NOTE — TELEPHONE ENCOUNTER
----- Message from Orville Salinas MD sent at 6/23/2023  3:48 PM EDT -----  Recent labs were reviewed. Glucose/Blood sugar was normal.  Kidney function, liver function and thyroid function  were normal.  Cholesterol is better  Not anemic.  Iron  andB12 levels are normal.   Orville Salinas MD

## 2023-09-13 RX ORDER — GEMFIBROZIL 600 MG/1
TABLET, FILM COATED ORAL
Qty: 90 TABLET | Refills: 3 | Status: SHIPPED | OUTPATIENT
Start: 2023-09-13

## 2024-02-16 ENCOUNTER — OFFICE VISIT (OUTPATIENT)
Dept: INTERNAL MEDICINE CLINIC | Facility: CLINIC | Age: 84
End: 2024-02-16

## 2024-02-16 VITALS
OXYGEN SATURATION: 97 % | HEIGHT: 60 IN | BODY MASS INDEX: 25.72 KG/M2 | DIASTOLIC BLOOD PRESSURE: 88 MMHG | TEMPERATURE: 97.2 F | WEIGHT: 131 LBS | HEART RATE: 68 BPM | SYSTOLIC BLOOD PRESSURE: 158 MMHG

## 2024-02-16 DIAGNOSIS — E78.2 MIXED HYPERLIPIDEMIA: ICD-10-CM

## 2024-02-16 DIAGNOSIS — M48.061 SPINAL STENOSIS, LUMBAR REGION WITHOUT NEUROGENIC CLAUDICATION: ICD-10-CM

## 2024-02-16 DIAGNOSIS — M15.9 GENERALIZED OSTEOARTHRITIS OF HAND: ICD-10-CM

## 2024-02-16 DIAGNOSIS — I10 ESSENTIAL (PRIMARY) HYPERTENSION: Primary | ICD-10-CM

## 2024-02-16 NOTE — PROGRESS NOTES
1995    Laminectomy    UPPER GASTROINTESTINAL ENDOSCOPY       Family History   Problem Relation Age of Onset    Diabetes Brother     Headache Brother     Coronary Art Dis Father     Cancer Mother      Current Outpatient Medications   Medication Sig Dispense Refill    gemfibrozil (LOPID) 600 MG tablet Take 1 tablet by mouth once daily 90 tablet 3    dilTIAZem (CARDIZEM CD) 300 MG extended release capsule Take 1 capsule by mouth daily 90 capsule 3    enalapril (VASOTEC) 5 MG tablet Take 2 tablets by mouth 2 times daily 360 tablet 3    acetaminophen (TYLENOL) 500 MG tablet Take by mouth every 6 hours as needed      calcium carbonate 1500 (600 Ca) MG TABS tablet Take 1 tablet by mouth 2 times daily      omeprazole (PRILOSEC) 20 MG delayed release capsule Take 1 capsule by mouth daily (Patient not taking: Reported on 2/16/2024) 90 capsule 3     No current facility-administered medications for this visit.     Health Maintenance   Topic Date Due    COVID-19 Vaccine (1) Never done    DTaP/Tdap/Td vaccine (1 - Tdap) Never done    Shingles vaccine (1 of 2) Never done    Respiratory Syncytial Virus (RSV) Pregnant or age 60 yrs+ (1 - 1-dose 60+ series) Never done    Breast cancer screen  11/21/2021    Flu vaccine (1) Never done    Annual Wellness Visit (Medicare Advantage)  01/01/2024    Depression Screen  06/19/2024    DEXA (modify frequency per FRAX score)  Completed    Pneumococcal 65+ years Vaccine  Completed    Hepatitis A vaccine  Aged Out    Hepatitis B vaccine  Aged Out    Hib vaccine  Aged Out    Polio vaccine  Aged Out    Meningococcal (ACWY) vaccine  Aged Out             Review of Systems  Review of Systems    BP (!) 189/93 (Site: Right Upper Arm, Position: Sitting, Cuff Size: Medium Adult) Comment: recheck  Pulse 68   Temp 97.2 °F (36.2 °C) (Temporal)   Ht 1.524 m (5')   Wt 59.4 kg (131 lb)   SpO2 97%   BMI 25.58 kg/m²     Wt Readings from Last 3 Encounters:   02/16/24 59.4 kg (131 lb)   06/20/23 58.5 kg (129

## 2024-04-08 RX ORDER — ENALAPRIL MALEATE 10 MG/1
10 TABLET ORAL 2 TIMES DAILY
Qty: 180 TABLET | Refills: 3 | Status: SHIPPED | OUTPATIENT
Start: 2024-04-08

## 2024-04-08 NOTE — TELEPHONE ENCOUNTER
Please notify patient I sent in rx for her vasotec. I sent in a 10mg tablet instead of a 5mg tablet. So she just takes 1 tablet twice a day.    Make sure she understands so she does not over medicate when her rx comes in.

## 2024-04-10 ENCOUNTER — OFFICE VISIT (OUTPATIENT)
Dept: INTERNAL MEDICINE CLINIC | Facility: CLINIC | Age: 84
End: 2024-04-10
Payer: MEDICARE

## 2024-04-10 VITALS
OXYGEN SATURATION: 99 % | SYSTOLIC BLOOD PRESSURE: 148 MMHG | HEART RATE: 76 BPM | WEIGHT: 125.8 LBS | RESPIRATION RATE: 18 BRPM | BODY MASS INDEX: 24.7 KG/M2 | HEIGHT: 60 IN | DIASTOLIC BLOOD PRESSURE: 82 MMHG | TEMPERATURE: 97.9 F

## 2024-04-10 DIAGNOSIS — I10 ESSENTIAL (PRIMARY) HYPERTENSION: Primary | ICD-10-CM

## 2024-04-10 DIAGNOSIS — J30.1 SEASONAL ALLERGIC RHINITIS DUE TO POLLEN: ICD-10-CM

## 2024-04-10 PROCEDURE — 99213 OFFICE O/P EST LOW 20 MIN: CPT | Performed by: NURSE PRACTITIONER

## 2024-04-10 PROCEDURE — 1123F ACP DISCUSS/DSCN MKR DOCD: CPT | Performed by: NURSE PRACTITIONER

## 2024-04-10 SDOH — ECONOMIC STABILITY: INCOME INSECURITY: HOW HARD IS IT FOR YOU TO PAY FOR THE VERY BASICS LIKE FOOD, HOUSING, MEDICAL CARE, AND HEATING?: NOT HARD AT ALL

## 2024-04-10 SDOH — ECONOMIC STABILITY: FOOD INSECURITY: WITHIN THE PAST 12 MONTHS, YOU WORRIED THAT YOUR FOOD WOULD RUN OUT BEFORE YOU GOT MONEY TO BUY MORE.: NEVER TRUE

## 2024-04-10 SDOH — ECONOMIC STABILITY: FOOD INSECURITY: WITHIN THE PAST 12 MONTHS, THE FOOD YOU BOUGHT JUST DIDN'T LAST AND YOU DIDN'T HAVE MONEY TO GET MORE.: NEVER TRUE

## 2024-04-10 ASSESSMENT — PATIENT HEALTH QUESTIONNAIRE - PHQ9
SUM OF ALL RESPONSES TO PHQ QUESTIONS 1-9: 0
1. LITTLE INTEREST OR PLEASURE IN DOING THINGS: NOT AT ALL
2. FEELING DOWN, DEPRESSED OR HOPELESS: NOT AT ALL
SUM OF ALL RESPONSES TO PHQ9 QUESTIONS 1 & 2: 0

## 2024-04-10 ASSESSMENT — ENCOUNTER SYMPTOMS
SHORTNESS OF BREATH: 0
SINUS PAIN: 0
SORE THROAT: 0
TROUBLE SWALLOWING: 0
SINUS PRESSURE: 0

## 2024-04-26 ENCOUNTER — TELEPHONE (OUTPATIENT)
Dept: INTERNAL MEDICINE CLINIC | Facility: CLINIC | Age: 84
End: 2024-04-26

## 2024-04-30 NOTE — TELEPHONE ENCOUNTER
Patient states that readings have been under 150 nothing above. Home readings have been:    4/10 132/72  65  4/11 140/63  72  4/20 130/68  70  4/21 141/67  63  4/22 148/68  64

## 2024-07-08 RX ORDER — DILTIAZEM HYDROCHLORIDE 300 MG/1
300 CAPSULE, EXTENDED RELEASE ORAL DAILY
Qty: 90 CAPSULE | Refills: 3 | Status: SHIPPED | OUTPATIENT
Start: 2024-07-08

## 2024-08-19 ENCOUNTER — OFFICE VISIT (OUTPATIENT)
Dept: INTERNAL MEDICINE CLINIC | Facility: CLINIC | Age: 84
End: 2024-08-19

## 2024-08-19 VITALS
BODY MASS INDEX: 24.54 KG/M2 | OXYGEN SATURATION: 98 % | HEART RATE: 68 BPM | SYSTOLIC BLOOD PRESSURE: 168 MMHG | HEIGHT: 60 IN | WEIGHT: 125 LBS | DIASTOLIC BLOOD PRESSURE: 77 MMHG | TEMPERATURE: 97.3 F

## 2024-08-19 DIAGNOSIS — I10 ESSENTIAL (PRIMARY) HYPERTENSION: ICD-10-CM

## 2024-08-19 DIAGNOSIS — M48.061 SPINAL STENOSIS, LUMBAR REGION WITHOUT NEUROGENIC CLAUDICATION: ICD-10-CM

## 2024-08-19 DIAGNOSIS — E78.2 MIXED HYPERLIPIDEMIA: ICD-10-CM

## 2024-08-19 DIAGNOSIS — I10 ESSENTIAL (PRIMARY) HYPERTENSION: Primary | ICD-10-CM

## 2024-08-19 DIAGNOSIS — M81.0 AGE-RELATED OSTEOPOROSIS WITHOUT CURRENT PATHOLOGICAL FRACTURE: ICD-10-CM

## 2024-08-19 DIAGNOSIS — I73.9 PAD (PERIPHERAL ARTERY DISEASE) (HCC): ICD-10-CM

## 2024-08-19 DIAGNOSIS — L98.9 SKIN LESIONS: ICD-10-CM

## 2024-08-19 LAB
25(OH)D3 SERPL-MCNC: 89.6 NG/ML (ref 30–100)
ALBUMIN SERPL-MCNC: 3.9 G/DL (ref 3.2–4.6)
ALBUMIN/GLOB SERPL: 1.4 (ref 1–1.9)
ALP SERPL-CCNC: 90 U/L (ref 35–104)
ALT SERPL-CCNC: 30 U/L (ref 12–65)
ANION GAP SERPL CALC-SCNC: 11 MMOL/L (ref 9–18)
AST SERPL-CCNC: 36 U/L (ref 15–37)
BASOPHILS # BLD: 0.1 K/UL (ref 0–0.2)
BASOPHILS NFR BLD: 1 % (ref 0–2)
BILIRUB SERPL-MCNC: 0.7 MG/DL (ref 0–1.2)
BUN SERPL-MCNC: 19 MG/DL (ref 8–23)
CALCIUM SERPL-MCNC: 10.1 MG/DL (ref 8.8–10.2)
CHLORIDE SERPL-SCNC: 103 MMOL/L (ref 98–107)
CHOLEST SERPL-MCNC: 230 MG/DL (ref 0–200)
CO2 SERPL-SCNC: 26 MMOL/L (ref 20–28)
CREAT SERPL-MCNC: 0.74 MG/DL (ref 0.6–1.1)
DIFFERENTIAL METHOD BLD: ABNORMAL
EOSINOPHIL # BLD: 0.4 K/UL (ref 0–0.8)
EOSINOPHIL NFR BLD: 6 % (ref 0.5–7.8)
ERYTHROCYTE [DISTWIDTH] IN BLOOD BY AUTOMATED COUNT: 13 % (ref 11.9–14.6)
GLOBULIN SER CALC-MCNC: 2.8 G/DL (ref 2.3–3.5)
GLUCOSE SERPL-MCNC: 86 MG/DL (ref 70–99)
HCT VFR BLD AUTO: 38.9 % (ref 35.8–46.3)
HDLC SERPL-MCNC: 78 MG/DL (ref 40–60)
HDLC SERPL: 2.9 (ref 0–5)
HGB BLD-MCNC: 12.2 G/DL (ref 11.7–15.4)
IMM GRANULOCYTES # BLD AUTO: 0 K/UL (ref 0–0.5)
IMM GRANULOCYTES NFR BLD AUTO: 0 % (ref 0–5)
LDLC SERPL CALC-MCNC: 134 MG/DL (ref 0–100)
LYMPHOCYTES # BLD: 1.9 K/UL (ref 0.5–4.6)
LYMPHOCYTES NFR BLD: 29 % (ref 13–44)
MCH RBC QN AUTO: 30.8 PG (ref 26.1–32.9)
MCHC RBC AUTO-ENTMCNC: 31.4 G/DL (ref 31.4–35)
MCV RBC AUTO: 98.2 FL (ref 82–102)
MONOCYTES # BLD: 0.6 K/UL (ref 0.1–1.3)
MONOCYTES NFR BLD: 9 % (ref 4–12)
NEUTS SEG # BLD: 3.5 K/UL (ref 1.7–8.2)
NEUTS SEG NFR BLD: 55 % (ref 43–78)
NRBC # BLD: 0 K/UL (ref 0–0.2)
PLATELET # BLD AUTO: 347 K/UL (ref 150–450)
PMV BLD AUTO: 9.7 FL (ref 9.4–12.3)
POTASSIUM SERPL-SCNC: 4.2 MMOL/L (ref 3.5–5.1)
PROT SERPL-MCNC: 6.7 G/DL (ref 6.3–8.2)
RBC # BLD AUTO: 3.96 M/UL (ref 4.05–5.2)
SODIUM SERPL-SCNC: 140 MMOL/L (ref 136–145)
TRIGL SERPL-MCNC: 92 MG/DL (ref 0–150)
TSH W FREE THYROID IF ABNORMAL: 2.84 UIU/ML (ref 0.27–4.2)
VLDLC SERPL CALC-MCNC: 18 MG/DL (ref 6–23)
WBC # BLD AUTO: 6.5 K/UL (ref 4.3–11.1)

## 2024-08-19 RX ORDER — GEMFIBROZIL 600 MG/1
TABLET, FILM COATED ORAL
Qty: 90 TABLET | Refills: 3 | Status: SHIPPED | OUTPATIENT
Start: 2024-08-19

## 2024-08-19 NOTE — PROGRESS NOTES
Systems  Review of Systems    BP (!) 215/97 (Site: Right Upper Arm, Position: Sitting, Cuff Size: Medium Adult) Comment: recheck  Pulse 68   Temp 97.3 °F (36.3 °C) (Temporal)   Ht 1.524 m (5')   Wt 56.7 kg (125 lb)   SpO2 98%   BMI 24.41 kg/m²     Wt Readings from Last 3 Encounters:   08/19/24 56.7 kg (125 lb)   04/10/24 57.1 kg (125 lb 12.8 oz)   02/16/24 59.4 kg (131 lb)       Physical Exam    Physical Exam  Vitals and nursing note reviewed.   Constitutional:       Appearance: Normal appearance.   HENT:      Head: Normocephalic and atraumatic.      Right Ear: Tympanic membrane normal.      Left Ear: Tympanic membrane normal.   Cardiovascular:      Rate and Rhythm: Normal rate and regular rhythm.   Pulmonary:      Effort: Pulmonary effort is normal.      Breath sounds: Normal breath sounds.   Abdominal:      General: Bowel sounds are normal.      Palpations: Abdomen is soft.   Musculoskeletal:      Right hand: Deformity and tenderness present. No swelling.      Left hand: Swelling, deformity and tenderness present.      Lumbar back: Deformity and tenderness present. Decreased range of motion.      Comments: Stooped posture   Neurological:      Mental Status: She is alert.             Assessment & Plan    There are no diagnoses linked to this encounter.       Encounter Diagnoses   Name Primary?    Essential (primary) hypertension Yes    Mixed hyperlipidemia     Spinal stenosis, lumbar region without neurogenic claudication     Age-related osteoporosis without current pathological fracture     Skin lesions     PAD (peripheral artery disease) ([facility])        Orders Placed This Encounter   Medications    gemfibrozil (LOPID) 600 MG tablet     Sig: Take 1 tablet by mouth once daily     Dispense:  90 tablet     Refill:  3       Orders Placed This Encounter   Procedures    Lipid Panel     Standing Status:   Future     Number of Occurrences:   1     Standing Expiration Date:   8/19/2025    CBC with Auto Differential

## 2024-08-20 NOTE — RESULT ENCOUNTER NOTE
Recent labs were reviewed. Glucose/Blood sugar was normal.  Kidney function, liver function and thyroid function  were normal.    Cholesterol is is a little elevated.  Low fat high fiber diet and regular exercise recommended.

## 2024-08-21 ENCOUNTER — TELEPHONE (OUTPATIENT)
Dept: INTERNAL MEDICINE CLINIC | Facility: CLINIC | Age: 84
End: 2024-08-21

## 2024-08-21 NOTE — TELEPHONE ENCOUNTER
----- Message from Dr. Troy Mcneill MD sent at 8/20/2024  5:32 PM EDT -----  Recent labs were reviewed. Glucose/Blood sugar was normal.  Kidney function, liver function and thyroid function  were normal.    Cholesterol is is a little elevated.  Low fat high fiber diet and regular exercise recommended.

## 2024-11-06 ENCOUNTER — OFFICE VISIT (OUTPATIENT)
Dept: INTERNAL MEDICINE CLINIC | Facility: CLINIC | Age: 84
End: 2024-11-06

## 2024-11-06 VITALS
DIASTOLIC BLOOD PRESSURE: 65 MMHG | HEART RATE: 83 BPM | TEMPERATURE: 98.2 F | WEIGHT: 124.8 LBS | OXYGEN SATURATION: 97 % | SYSTOLIC BLOOD PRESSURE: 148 MMHG | HEIGHT: 60 IN | BODY MASS INDEX: 24.5 KG/M2

## 2024-11-06 DIAGNOSIS — M19.012 OSTEOARTHRITIS OF LEFT SHOULDER, UNSPECIFIED OSTEOARTHRITIS TYPE: ICD-10-CM

## 2024-11-06 DIAGNOSIS — M25.512 NONTRAUMATIC PAIN OF LEFT SHOULDER: Primary | ICD-10-CM

## 2024-11-06 RX ORDER — TRIAMCINOLONE ACETONIDE 40 MG/ML
40 INJECTION, SUSPENSION INTRA-ARTICULAR; INTRAMUSCULAR ONCE
Status: COMPLETED | OUTPATIENT
Start: 2024-11-06 | End: 2024-11-06

## 2024-11-06 RX ADMIN — TRIAMCINOLONE ACETONIDE 40 MG: 40 INJECTION, SUSPENSION INTRA-ARTICULAR; INTRAMUSCULAR at 15:53

## 2024-11-06 ASSESSMENT — ENCOUNTER SYMPTOMS
SHORTNESS OF BREATH: 0
BACK PAIN: 1
CHEST TIGHTNESS: 0

## 2024-11-06 NOTE — PROGRESS NOTES
Discontinued     Family History   Problem Relation Age of Onset    Diabetes Brother     Headache Brother     Coronary Art Dis Father     Cancer Mother              Review of Systems  Review of Systems   Constitutional:  Positive for activity change and fever.   Respiratory:  Negative for shortness of breath.    Cardiovascular:  Negative for chest pain.   Musculoskeletal:  Positive for arthralgias, back pain and myalgias. Negative for joint swelling.        + Left shoulder pain w/ decreased ROM   Neurological:  Negative for tingling, weakness and numbness.       BP (!) 148/65 Comment: at home BP  Pulse 83   Temp 98.2 °F (36.8 °C) (Temporal)   Ht 1.524 m (5')   Wt 56.6 kg (124 lb 12.8 oz)   SpO2 97%   BMI 24.37 kg/m²       Physical Exam    Physical Exam  Constitutional:       Appearance: Normal appearance. She is normal weight.   HENT:      Head: Normocephalic and atraumatic.      Mouth/Throat:      Mouth: Mucous membranes are moist.   Eyes:      Extraocular Movements: Extraocular movements intact.      Pupils: Pupils are equal, round, and reactive to light.   Cardiovascular:      Rate and Rhythm: Normal rate and regular rhythm.   Pulmonary:      Effort: Pulmonary effort is normal.      Breath sounds: Normal breath sounds.   Musculoskeletal:      Right shoulder: Normal.      Left shoulder: Crepitus present. No bony tenderness. Decreased range of motion. Normal strength.      Cervical back: Normal range of motion and neck supple.      Comments: Left shoulder lateral ROM <90.    Skin:     General: Skin is warm and dry.   Neurological:      General: No focal deficit present.      Mental Status: She is alert and oriented to person, place, and time. Mental status is at baseline.   Psychiatric:         Mood and Affect: Mood normal.         Behavior: Behavior normal.         Assessment & Plan     Diagnosis Orders   1. Nontraumatic pain of left shoulder  triamcinolone acetonide (KENALOG-40) injection 40 mg      2.

## 2024-11-06 NOTE — PROGRESS NOTES
Aged Out    Polio vaccine  Aged Out    Meningococcal (ACWY) vaccine  Aged Out    DEXA (modify frequency per FRAX score)  Discontinued    Breast cancer screen  Discontinued     Family History   Problem Relation Age of Onset    Diabetes Brother     Headache Brother     Coronary Art Dis Father     Cancer Mother              Review of Systems  Review of Systems   Constitutional:  Positive for activity change. Negative for chills, fatigue and fever.   Respiratory:  Negative for chest tightness and shortness of breath.    Cardiovascular:  Negative for leg swelling.   Musculoskeletal:  Positive for arthralgias, back pain, gait problem and myalgias. Negative for joint swelling.   Neurological:  Negative for dizziness, tremors, weakness and light-headedness.   Psychiatric/Behavioral:  Negative for sleep disturbance. The patient is not nervous/anxious.        BP (!) 148/65 Comment: at home BP  Pulse 83   Temp 98.2 °F (36.8 °C) (Temporal)   Ht 1.524 m (5')   Wt 56.6 kg (124 lb 12.8 oz)   SpO2 97%   BMI 24.37 kg/m²       Physical Exam    Physical Exam  Vitals and nursing note reviewed.   Constitutional:       Appearance: Normal appearance.   HENT:      Head: Normocephalic and atraumatic.      Nose: Nose normal.      Mouth/Throat:      Mouth: Mucous membranes are moist.   Eyes:      Extraocular Movements: Extraocular movements intact.      Pupils: Pupils are equal, round, and reactive to light.   Cardiovascular:      Rate and Rhythm: Normal rate and regular rhythm.      Heart sounds: Normal heart sounds.   Pulmonary:      Effort: Pulmonary effort is normal. No respiratory distress.   Abdominal:      General: There is no distension.      Palpations: Abdomen is soft.   Musculoskeletal:      Right shoulder: Normal.      Left shoulder: Crepitus present. No swelling, effusion or tenderness. Decreased range of motion. Normal strength.      Cervical back: Normal range of motion and neck supple. No tenderness.   Skin:     General:

## 2024-12-04 NOTE — PROGRESS NOTES
144/67  73  153/75  73  157/71  61   125/48  58  152/71  61  140/80  73  139/59  66  141/70  76          
Discharged by STELLA Shaffer  
10 MG tablet Take 1 tablet by mouth 2 times daily 180 tablet 3    gemfibrozil (LOPID) 600 MG tablet Take 1 tablet by mouth once daily 90 tablet 3    dilTIAZem (CARDIZEM CD) 300 MG extended release capsule Take 1 capsule by mouth daily 90 capsule 3    acetaminophen (TYLENOL) 500 MG tablet Take by mouth every 6 hours as needed      calcium carbonate 1500 (600 Ca) MG TABS tablet Take 1 tablet by mouth 2 times daily       No current facility-administered medications for this visit.     Health Maintenance   Topic Date Due    COVID-19 Vaccine (1) Never done    DTaP/Tdap/Td vaccine (1 - Tdap) Never done    Shingles vaccine (1 of 2) Never done    Respiratory Syncytial Virus (RSV) Pregnant or age 60 yrs+ (1 - 1-dose 60+ series) Never done    Annual Wellness Visit (Medicare Advantage)  01/01/2024    Depression Screen  06/19/2024    Flu vaccine (Season Ended) 08/01/2024    Pneumococcal 65+ years Vaccine  Completed    Hepatitis A vaccine  Aged Out    Hepatitis B vaccine  Aged Out    Hib vaccine  Aged Out    Polio vaccine  Aged Out    Meningococcal (ACWY) vaccine  Aged Out    DEXA (modify frequency per FRAX score)  Discontinued    Breast cancer screen  Discontinued     Family History   Problem Relation Age of Onset    Diabetes Brother     Headache Brother     Coronary Art Dis Father     Cancer Mother              Review of Systems  Review of Systems   Constitutional:  Negative for appetite change, chills and fever.   HENT:  Positive for ear pain, hearing loss and sneezing. Negative for ear discharge, sinus pressure, sinus pain, sore throat and trouble swallowing.    Eyes:  Negative for visual disturbance.   Respiratory:  Negative for shortness of breath.    Cardiovascular:  Negative for chest pain, palpitations and leg swelling.   Musculoskeletal:  Positive for gait problem.   Neurological:  Negative for headaches.   All other systems reviewed and are negative.      BP (!) 189/96 (Site: Right Upper Arm, Position: Sitting)

## 2025-03-11 RX ORDER — ENALAPRIL MALEATE 10 MG/1
10 TABLET ORAL 2 TIMES DAILY
Qty: 180 TABLET | Refills: 3 | Status: SHIPPED | OUTPATIENT
Start: 2025-03-11

## 2025-06-23 ENCOUNTER — OFFICE VISIT (OUTPATIENT)
Dept: INTERNAL MEDICINE CLINIC | Facility: CLINIC | Age: 85
End: 2025-06-23
Payer: MEDICARE

## 2025-06-23 VITALS
SYSTOLIC BLOOD PRESSURE: 155 MMHG | WEIGHT: 125 LBS | DIASTOLIC BLOOD PRESSURE: 72 MMHG | BODY MASS INDEX: 24.54 KG/M2 | HEIGHT: 60 IN | TEMPERATURE: 97.5 F | RESPIRATION RATE: 16 BRPM | OXYGEN SATURATION: 97 % | HEART RATE: 77 BPM

## 2025-06-23 DIAGNOSIS — E78.2 MIXED HYPERLIPIDEMIA: ICD-10-CM

## 2025-06-23 DIAGNOSIS — Z00.00 MEDICARE ANNUAL WELLNESS VISIT, SUBSEQUENT: Primary | ICD-10-CM

## 2025-06-23 DIAGNOSIS — H01.006 BLEPHARITIS OF BOTH EYES, UNSPECIFIED EYELID, UNSPECIFIED TYPE: ICD-10-CM

## 2025-06-23 DIAGNOSIS — H01.003 BLEPHARITIS OF BOTH EYES, UNSPECIFIED EYELID, UNSPECIFIED TYPE: ICD-10-CM

## 2025-06-23 DIAGNOSIS — R60.0 BILATERAL LEG EDEMA: ICD-10-CM

## 2025-06-23 DIAGNOSIS — M19.012 OSTEOARTHRITIS OF LEFT SHOULDER, UNSPECIFIED OSTEOARTHRITIS TYPE: ICD-10-CM

## 2025-06-23 DIAGNOSIS — I10 ESSENTIAL (PRIMARY) HYPERTENSION: ICD-10-CM

## 2025-06-23 DIAGNOSIS — M25.512 LEFT SHOULDER PAIN, UNSPECIFIED CHRONICITY: ICD-10-CM

## 2025-06-23 LAB
ALBUMIN SERPL-MCNC: 3.4 G/DL (ref 3.2–4.6)
ALBUMIN/GLOB SERPL: 1 (ref 1–1.9)
ALP SERPL-CCNC: 110 U/L (ref 35–104)
ALT SERPL-CCNC: 17 U/L (ref 8–45)
ANION GAP SERPL CALC-SCNC: 12 MMOL/L (ref 7–16)
AST SERPL-CCNC: 24 U/L (ref 15–37)
BASOPHILS # BLD: 0.14 K/UL (ref 0–0.2)
BASOPHILS NFR BLD: 1.7 % (ref 0–2)
BILIRUB SERPL-MCNC: 0.3 MG/DL (ref 0–1.2)
BUN SERPL-MCNC: 25 MG/DL (ref 8–23)
CALCIUM SERPL-MCNC: 9.9 MG/DL (ref 8.8–10.2)
CHLORIDE SERPL-SCNC: 107 MMOL/L (ref 98–107)
CO2 SERPL-SCNC: 23 MMOL/L (ref 20–29)
CREAT SERPL-MCNC: 0.69 MG/DL (ref 0.6–1.1)
DIFFERENTIAL METHOD BLD: ABNORMAL
EOSINOPHIL # BLD: 0.86 K/UL (ref 0–0.8)
EOSINOPHIL NFR BLD: 10.2 % (ref 0.5–7.8)
ERYTHROCYTE [DISTWIDTH] IN BLOOD BY AUTOMATED COUNT: 13.1 % (ref 11.9–14.6)
GLOBULIN SER CALC-MCNC: 3.3 G/DL (ref 2.3–3.5)
GLUCOSE SERPL-MCNC: 103 MG/DL (ref 70–99)
HCT VFR BLD AUTO: 40.1 % (ref 35.8–46.3)
HGB BLD-MCNC: 12.6 G/DL (ref 11.7–15.4)
IMM GRANULOCYTES # BLD AUTO: 0.03 K/UL (ref 0–0.5)
IMM GRANULOCYTES NFR BLD AUTO: 0.4 % (ref 0–5)
LYMPHOCYTES # BLD: 2.62 K/UL (ref 0.5–4.6)
LYMPHOCYTES NFR BLD: 31.2 % (ref 13–44)
MCH RBC QN AUTO: 31.1 PG (ref 26.1–32.9)
MCHC RBC AUTO-ENTMCNC: 31.4 G/DL (ref 31.4–35)
MCV RBC AUTO: 99 FL (ref 82–102)
MONOCYTES # BLD: 0.67 K/UL (ref 0.1–1.3)
MONOCYTES NFR BLD: 8 % (ref 4–12)
NEUTS SEG # BLD: 4.08 K/UL (ref 1.7–8.2)
NEUTS SEG NFR BLD: 48.5 % (ref 43–78)
NRBC # BLD: 0 K/UL (ref 0–0.2)
PLATELET # BLD AUTO: 407 K/UL (ref 150–450)
PMV BLD AUTO: 9.9 FL (ref 9.4–12.3)
POTASSIUM SERPL-SCNC: 4 MMOL/L (ref 3.5–5.1)
PROT SERPL-MCNC: 6.7 G/DL (ref 6.3–8.2)
RBC # BLD AUTO: 4.05 M/UL (ref 4.05–5.2)
SODIUM SERPL-SCNC: 143 MMOL/L (ref 136–145)
TSH W FREE THYROID IF ABNORMAL: 2.26 UIU/ML (ref 0.27–4.2)
WBC # BLD AUTO: 8.4 K/UL (ref 4.3–11.1)

## 2025-06-23 PROCEDURE — 1126F AMNT PAIN NOTED NONE PRSNT: CPT | Performed by: INTERNAL MEDICINE

## 2025-06-23 PROCEDURE — G0439 PPPS, SUBSEQ VISIT: HCPCS | Performed by: INTERNAL MEDICINE

## 2025-06-23 PROCEDURE — 1123F ACP DISCUSS/DSCN MKR DOCD: CPT | Performed by: INTERNAL MEDICINE

## 2025-06-23 PROCEDURE — 99213 OFFICE O/P EST LOW 20 MIN: CPT | Performed by: INTERNAL MEDICINE

## 2025-06-23 RX ORDER — METHYLPREDNISOLONE ACETATE 40 MG/ML
40 INJECTION, SUSPENSION INTRA-ARTICULAR; INTRALESIONAL; INTRAMUSCULAR; SOFT TISSUE ONCE
Status: COMPLETED | OUTPATIENT
Start: 2025-06-23 | End: 2025-06-23

## 2025-06-23 RX ORDER — DILTIAZEM HYDROCHLORIDE 300 MG/1
300 CAPSULE, COATED, EXTENDED RELEASE ORAL DAILY
Qty: 90 CAPSULE | Refills: 3 | Status: SHIPPED | OUTPATIENT
Start: 2025-06-23

## 2025-06-23 RX ORDER — GEMFIBROZIL 600 MG/1
TABLET, FILM COATED ORAL
Qty: 90 TABLET | Refills: 3 | Status: SHIPPED | OUTPATIENT
Start: 2025-06-23

## 2025-06-23 RX ORDER — BACITRACIN ZINC AND POLYMYXIN B SULFATE 500; 10000 [USP'U]/G; [USP'U]/G
OINTMENT OPHTHALMIC 2 TIMES DAILY
Qty: 1 EACH | Refills: 0 | Status: SHIPPED | OUTPATIENT
Start: 2025-06-23 | End: 2025-07-03

## 2025-06-23 RX ADMIN — METHYLPREDNISOLONE ACETATE 40 MG: 40 INJECTION, SUSPENSION INTRA-ARTICULAR; INTRALESIONAL; INTRAMUSCULAR; SOFT TISSUE at 17:25

## 2025-06-23 SDOH — ECONOMIC STABILITY: FOOD INSECURITY: WITHIN THE PAST 12 MONTHS, YOU WORRIED THAT YOUR FOOD WOULD RUN OUT BEFORE YOU GOT MONEY TO BUY MORE.: NEVER TRUE

## 2025-06-23 SDOH — ECONOMIC STABILITY: FOOD INSECURITY: WITHIN THE PAST 12 MONTHS, THE FOOD YOU BOUGHT JUST DIDN'T LAST AND YOU DIDN'T HAVE MONEY TO GET MORE.: NEVER TRUE

## 2025-06-23 ASSESSMENT — PATIENT HEALTH QUESTIONNAIRE - PHQ9
SUM OF ALL RESPONSES TO PHQ QUESTIONS 1-9: 0
1. LITTLE INTEREST OR PLEASURE IN DOING THINGS: NOT AT ALL
1. LITTLE INTEREST OR PLEASURE IN DOING THINGS: NOT AT ALL
SUM OF ALL RESPONSES TO PHQ QUESTIONS 1-9: 0
2. FEELING DOWN, DEPRESSED OR HOPELESS: NOT AT ALL
2. FEELING DOWN, DEPRESSED OR HOPELESS: NOT AT ALL
SUM OF ALL RESPONSES TO PHQ QUESTIONS 1-9: 0

## 2025-06-23 NOTE — PROGRESS NOTES
Medicare Annual Wellness Visit    Lacey Curtis is here for Medicare AWV (Subsequent), 6 Month Check-up (Pt presents to the office today for a 6 month check-up./), Eye Problem (The left eye is really red and it burns; saw the eye doctor back in October but its not improving), Leg Swelling (Having swelling in her feet and legs), and Shoulder Pain (Having pain in the left shoulder would like another injection )    Assessment & Plan   Medicare annual wellness visit, subsequent  Mixed hyperlipidemia  The following orders have not been finalized:  -     gemfibrozil (LOPID) 600 MG tablet       No follow-ups on file.     Subjective       Patient's complete Health Risk Assessment and screening values have been reviewed and are found in Flowsheets. The following problems were reviewed today and where indicated follow up appointments were made and/or referrals ordered.    Positive Risk Factor Screenings with Interventions:    Fall Risk:  Do you feel unsteady or are you worried about falling? : (!) yes  2 or more falls in past year?: no  Fall with injury in past year?: no     Interventions:    Reviewed medications, home hazards, visual acuity, and co-morbidities that can increase risk for falls  See AVS for additional education material    Cognitive:      Words recalled: 2 Words Recalled     Total Score Interpretation: Abnormal Mini-Cog  Interventions:  See AVS for additional education material            Inactivity:  On average, how many days per week do you engage in moderate to strenuous exercise (like a brisk walk)?: 0 days (!) Abnormal  On average, how many minutes do you engage in exercise at this level?: 0 min  Interventions:  See AVS for additional education material       Hearing Screen:  Do you or your family notice any trouble with your hearing that hasn't been managed with hearing aids?: (!) Yes    Interventions:  See AVS for additional education material                     Objective   Vitals:    06/23/25 1446

## 2025-06-23 NOTE — PROGRESS NOTES
06/23/2025   Location:[facility] PHYSICIAN SERVICES  [facility] INTERNAL MEDICINE  [place]  Patient #:  [id number]  YOB: 1940        History of Present Illness     Chief Complaint   Patient presents with    Medicare AWV     Subsequent    6 Month Check-up     Pt presents to the office today for a 6 month check-up.      Eye Problem     The left eye is really red and it burns; saw the eye doctor back in October but its not improving    Leg Swelling     Having swelling in her feet and legs    Shoulder Pain     Having pain in the left shoulder would like another injection        Ms. Curtis is a 85 y.o. female  who presents for This is a combined medical follow up office visit and a Medicare Wellness Visit.  The Wellness note has been reviewed.   Follow up and management of chronic medical issues.  Chronic active medical issues HTN, HLD, OA multiple joints with spinal stenosis, SARAHI with PUD  Reports better BP readings at home  History of Present Illness  An 85-year-old female presents with shoulder pain, neuropathy, eye problem, constipation, and leg swelling. She is accompanied by her granddaughter.    The patient reports shoulder pain for which she received an injection in October 2024, resulting in relief. However, her follow-up appointment in February 2025 was canceled due to COVID-19. She manages the pain with Aleve for 3-5 days each month.    Regarding neuropathy, the patient notes that the symptoms have been particularly bothersome recently.    For her eye problem, she has a red spot on her eyelid that was evaluated by an ophthalmologist in October 2024. She uses baby shampoo and eye drops four times daily. There is no itching, but her granddaughter notes discomfort and blurriness.    The patient has been experiencing constipation over the past few weeks and has increased her intake of apples and bananas.    Concerning leg swelling, the patient reports that her legs are not functioning optimally

## 2025-06-30 ENCOUNTER — RESULTS FOLLOW-UP (OUTPATIENT)
Dept: INTERNAL MEDICINE CLINIC | Facility: CLINIC | Age: 85
End: 2025-06-30

## 2025-06-30 PROCEDURE — 20610 DRAIN/INJ JOINT/BURSA W/O US: CPT | Performed by: INTERNAL MEDICINE
